# Patient Record
Sex: FEMALE | Race: WHITE | NOT HISPANIC OR LATINO | URBAN - METROPOLITAN AREA
[De-identification: names, ages, dates, MRNs, and addresses within clinical notes are randomized per-mention and may not be internally consistent; named-entity substitution may affect disease eponyms.]

---

## 2021-06-23 ENCOUNTER — APPOINTMENT (RX ONLY)
Dept: URBAN - METROPOLITAN AREA CLINIC 281 | Facility: CLINIC | Age: 61
Setting detail: DERMATOLOGY
End: 2021-06-23

## 2021-06-23 DIAGNOSIS — D22 MELANOCYTIC NEVI: ICD-10-CM

## 2021-06-23 DIAGNOSIS — D18.0 HEMANGIOMA: ICD-10-CM

## 2021-06-23 DIAGNOSIS — L81.4 OTHER MELANIN HYPERPIGMENTATION: ICD-10-CM

## 2021-06-23 PROBLEM — D22.39 MELANOCYTIC NEVI OF OTHER PARTS OF FACE: Status: ACTIVE | Noted: 2021-06-23

## 2021-06-23 PROBLEM — D22.5 MELANOCYTIC NEVI OF TRUNK: Status: ACTIVE | Noted: 2021-06-23

## 2021-06-23 PROBLEM — D18.01 HEMANGIOMA OF SKIN AND SUBCUTANEOUS TISSUE: Status: ACTIVE | Noted: 2021-06-23

## 2021-06-23 PROCEDURE — ? FULL BODY SKIN EXAM

## 2021-06-23 PROCEDURE — ? COUNSELING

## 2021-06-23 PROCEDURE — 99203 OFFICE O/P NEW LOW 30 MIN: CPT

## 2021-06-23 ASSESSMENT — LOCATION ZONE DERM
LOCATION ZONE: TRUNK
LOCATION ZONE: FACE

## 2021-06-23 ASSESSMENT — LOCATION DETAILED DESCRIPTION DERM
LOCATION DETAILED: RIGHT INFERIOR UPPER BACK
LOCATION DETAILED: LEFT LATERAL MALAR CHEEK
LOCATION DETAILED: LEFT SUPERIOR MEDIAL UPPER BACK
LOCATION DETAILED: RIGHT CENTRAL MALAR CHEEK

## 2021-06-23 ASSESSMENT — LOCATION SIMPLE DESCRIPTION DERM
LOCATION SIMPLE: RIGHT UPPER BACK
LOCATION SIMPLE: LEFT CHEEK
LOCATION SIMPLE: LEFT UPPER BACK
LOCATION SIMPLE: RIGHT CHEEK

## 2022-07-20 ENCOUNTER — APPOINTMENT (RX ONLY)
Dept: URBAN - METROPOLITAN AREA CLINIC 281 | Facility: CLINIC | Age: 62
Setting detail: DERMATOLOGY
End: 2022-07-20

## 2022-07-20 DIAGNOSIS — L82.1 OTHER SEBORRHEIC KERATOSIS: ICD-10-CM

## 2022-07-20 DIAGNOSIS — D18.0 HEMANGIOMA: ICD-10-CM

## 2022-07-20 DIAGNOSIS — L91.8 OTHER HYPERTROPHIC DISORDERS OF THE SKIN: ICD-10-CM

## 2022-07-20 DIAGNOSIS — L81.4 OTHER MELANIN HYPERPIGMENTATION: ICD-10-CM

## 2022-07-20 DIAGNOSIS — D22 MELANOCYTIC NEVI: ICD-10-CM

## 2022-07-20 PROBLEM — D22.5 MELANOCYTIC NEVI OF TRUNK: Status: ACTIVE | Noted: 2022-07-20

## 2022-07-20 PROBLEM — D18.01 HEMANGIOMA OF SKIN AND SUBCUTANEOUS TISSUE: Status: ACTIVE | Noted: 2022-07-20

## 2022-07-20 PROBLEM — D22.22 MELANOCYTIC NEVI OF LEFT EAR AND EXTERNAL AURICULAR CANAL: Status: ACTIVE | Noted: 2022-07-20

## 2022-07-20 PROCEDURE — ? FULL BODY SKIN EXAM

## 2022-07-20 PROCEDURE — ? COUNSELING

## 2022-07-20 PROCEDURE — ? SUNSCREEN RECOMMENDATIONS

## 2022-07-20 PROCEDURE — 99213 OFFICE O/P EST LOW 20 MIN: CPT

## 2022-07-20 ASSESSMENT — LOCATION SIMPLE DESCRIPTION DERM
LOCATION SIMPLE: LEFT FOREARM
LOCATION SIMPLE: UPPER BACK
LOCATION SIMPLE: RIGHT POSTERIOR UPPER ARM
LOCATION SIMPLE: LEFT CHEEK
LOCATION SIMPLE: RIGHT FOREARM
LOCATION SIMPLE: RIGHT ANTERIOR NECK
LOCATION SIMPLE: LEFT EAR
LOCATION SIMPLE: CHEST
LOCATION SIMPLE: ABDOMEN

## 2022-07-20 ASSESSMENT — LOCATION ZONE DERM
LOCATION ZONE: FACE
LOCATION ZONE: TRUNK
LOCATION ZONE: ARM
LOCATION ZONE: NECK
LOCATION ZONE: EAR

## 2022-07-20 ASSESSMENT — LOCATION DETAILED DESCRIPTION DERM
LOCATION DETAILED: INFERIOR THORACIC SPINE
LOCATION DETAILED: RIGHT VENTRAL PROXIMAL FOREARM
LOCATION DETAILED: RIGHT INFERIOR LATERAL NECK
LOCATION DETAILED: SUBXIPHOID
LOCATION DETAILED: RIGHT PROXIMAL DORSAL FOREARM
LOCATION DETAILED: LEFT PROXIMAL DORSAL FOREARM
LOCATION DETAILED: LEFT CENTRAL MALAR CHEEK
LOCATION DETAILED: LEFT SUPERIOR HELIX
LOCATION DETAILED: RIGHT PROXIMAL LATERAL POSTERIOR UPPER ARM
LOCATION DETAILED: LEFT DISTAL DORSAL FOREARM
LOCATION DETAILED: UPPER STERNUM

## 2023-07-13 ENCOUNTER — APPOINTMENT (RX ONLY)
Dept: URBAN - METROPOLITAN AREA CLINIC 281 | Facility: CLINIC | Age: 63
Setting detail: DERMATOLOGY
End: 2023-07-13

## 2023-07-13 DIAGNOSIS — D22 MELANOCYTIC NEVI: ICD-10-CM

## 2023-07-13 DIAGNOSIS — Z87.2 PERSONAL HISTORY OF DISEASES OF THE SKIN AND SUBCUTANEOUS TISSUE: ICD-10-CM

## 2023-07-13 DIAGNOSIS — L82.1 OTHER SEBORRHEIC KERATOSIS: ICD-10-CM

## 2023-07-13 DIAGNOSIS — D18.0 HEMANGIOMA: ICD-10-CM

## 2023-07-13 DIAGNOSIS — L81.4 OTHER MELANIN HYPERPIGMENTATION: ICD-10-CM

## 2023-07-13 DIAGNOSIS — L91.8 OTHER HYPERTROPHIC DISORDERS OF THE SKIN: ICD-10-CM

## 2023-07-13 DIAGNOSIS — B00.1 HERPESVIRAL VESICULAR DERMATITIS: ICD-10-CM | Status: WELL CONTROLLED

## 2023-07-13 PROBLEM — D22.5 MELANOCYTIC NEVI OF TRUNK: Status: ACTIVE | Noted: 2023-07-13

## 2023-07-13 PROBLEM — D18.01 HEMANGIOMA OF SKIN AND SUBCUTANEOUS TISSUE: Status: ACTIVE | Noted: 2023-07-13

## 2023-07-13 PROCEDURE — ? FULL BODY SKIN EXAM

## 2023-07-13 PROCEDURE — ? PRESCRIPTION

## 2023-07-13 PROCEDURE — ? SUNSCREEN RECOMMENDATIONS

## 2023-07-13 PROCEDURE — ? COUNSELING

## 2023-07-13 PROCEDURE — 99213 OFFICE O/P EST LOW 20 MIN: CPT

## 2023-07-13 RX ORDER — VALACYCLOVIR 1 G/1
TABLET, FILM COATED ORAL DAILY
Qty: 12 | Refills: 2 | Status: ERX | COMMUNITY
Start: 2023-07-13

## 2023-07-13 RX ADMIN — VALACYCLOVIR: 1 TABLET, FILM COATED ORAL at 00:00

## 2023-07-13 ASSESSMENT — LOCATION SIMPLE DESCRIPTION DERM
LOCATION SIMPLE: RIGHT UPPER BACK
LOCATION SIMPLE: RIGHT ANTERIOR NECK
LOCATION SIMPLE: CHEST
LOCATION SIMPLE: LEFT CHEEK
LOCATION SIMPLE: RIGHT POSTERIOR UPPER ARM
LOCATION SIMPLE: ABDOMEN
LOCATION SIMPLE: LEFT FOREARM
LOCATION SIMPLE: LEFT LIP
LOCATION SIMPLE: RIGHT FOREARM

## 2023-07-13 ASSESSMENT — LOCATION DETAILED DESCRIPTION DERM
LOCATION DETAILED: LEFT CENTRAL MALAR CHEEK
LOCATION DETAILED: RIGHT PROXIMAL LATERAL POSTERIOR UPPER ARM
LOCATION DETAILED: LEFT DISTAL DORSAL FOREARM
LOCATION DETAILED: RIGHT PROXIMAL DORSAL FOREARM
LOCATION DETAILED: RIGHT INFERIOR LATERAL NECK
LOCATION DETAILED: LEFT PROXIMAL DORSAL FOREARM
LOCATION DETAILED: RIGHT SUPERIOR UPPER BACK
LOCATION DETAILED: SUBXIPHOID
LOCATION DETAILED: UPPER STERNUM
LOCATION DETAILED: RIGHT VENTRAL PROXIMAL FOREARM
LOCATION DETAILED: LEFT SUPERIOR VERMILION LIP

## 2023-07-13 ASSESSMENT — LOCATION ZONE DERM
LOCATION ZONE: TRUNK
LOCATION ZONE: ARM
LOCATION ZONE: LIP
LOCATION ZONE: NECK
LOCATION ZONE: FACE

## 2023-07-13 NOTE — HPI: EVALUATION OF SKIN LESION(S)
Hpi Title: Evaluation of Skin Lesions
How Severe Are Your Spot(S)?: mild
Additional History: Has a list of concerns.

## 2024-07-23 ENCOUNTER — APPOINTMENT (RX ONLY)
Dept: URBAN - METROPOLITAN AREA CLINIC 281 | Facility: CLINIC | Age: 64
Setting detail: DERMATOLOGY
End: 2024-07-23

## 2024-07-23 DIAGNOSIS — D22 MELANOCYTIC NEVI: ICD-10-CM

## 2024-07-23 DIAGNOSIS — B00.1 HERPESVIRAL VESICULAR DERMATITIS: ICD-10-CM

## 2024-07-23 DIAGNOSIS — D18.0 HEMANGIOMA: ICD-10-CM

## 2024-07-23 DIAGNOSIS — L81.4 OTHER MELANIN HYPERPIGMENTATION: ICD-10-CM

## 2024-07-23 DIAGNOSIS — L91.8 OTHER HYPERTROPHIC DISORDERS OF THE SKIN: ICD-10-CM

## 2024-07-23 DIAGNOSIS — Z87.2 PERSONAL HISTORY OF DISEASES OF THE SKIN AND SUBCUTANEOUS TISSUE: ICD-10-CM

## 2024-07-23 DIAGNOSIS — L82.1 OTHER SEBORRHEIC KERATOSIS: ICD-10-CM

## 2024-07-23 PROBLEM — D18.01 HEMANGIOMA OF SKIN AND SUBCUTANEOUS TISSUE: Status: ACTIVE | Noted: 2024-07-23

## 2024-07-23 PROBLEM — D22.5 MELANOCYTIC NEVI OF TRUNK: Status: ACTIVE | Noted: 2024-07-23

## 2024-07-23 PROCEDURE — 99213 OFFICE O/P EST LOW 20 MIN: CPT | Mod: 25

## 2024-07-23 PROCEDURE — ? SUNSCREEN RECOMMENDATIONS

## 2024-07-23 PROCEDURE — 11200 RMVL SKIN TAGS UP TO&INC 15: CPT

## 2024-07-23 PROCEDURE — ? FULL BODY SKIN EXAM

## 2024-07-23 PROCEDURE — ? SKIN TAG REMOVAL

## 2024-07-23 PROCEDURE — ? COUNSELING

## 2024-07-23 ASSESSMENT — LOCATION ZONE DERM
LOCATION ZONE: TRUNK
LOCATION ZONE: FACE
LOCATION ZONE: AXILLAE
LOCATION ZONE: LIP
LOCATION ZONE: NECK
LOCATION ZONE: SCALP
LOCATION ZONE: ARM

## 2024-07-23 ASSESSMENT — LOCATION SIMPLE DESCRIPTION DERM
LOCATION SIMPLE: RIGHT ANTERIOR AXILLA
LOCATION SIMPLE: RIGHT FOREARM
LOCATION SIMPLE: LEFT LIP
LOCATION SIMPLE: LEFT CHEEK
LOCATION SIMPLE: SCALP
LOCATION SIMPLE: LEFT FOREARM
LOCATION SIMPLE: RIGHT ANTERIOR NECK
LOCATION SIMPLE: LEFT AXILLARY VAULT
LOCATION SIMPLE: ABDOMEN
LOCATION SIMPLE: RIGHT AXILLARY VAULT
LOCATION SIMPLE: RIGHT UPPER BACK
LOCATION SIMPLE: RIGHT CHEEK
LOCATION SIMPLE: CHEST
LOCATION SIMPLE: RIGHT POSTERIOR UPPER ARM

## 2024-07-23 ASSESSMENT — LOCATION DETAILED DESCRIPTION DERM
LOCATION DETAILED: LEFT SUPERIOR VERMILION LIP
LOCATION DETAILED: RIGHT SUPERIOR NASAL CHEEK
LOCATION DETAILED: LEFT AXILLARY VAULT
LOCATION DETAILED: LEFT INFERIOR FRONTAL SCALP
LOCATION DETAILED: RIGHT RIB CAGE
LOCATION DETAILED: RIGHT SUPERIOR UPPER BACK
LOCATION DETAILED: LEFT PROXIMAL DORSAL FOREARM
LOCATION DETAILED: RIGHT AXILLARY VAULT
LOCATION DETAILED: LEFT DISTAL DORSAL FOREARM
LOCATION DETAILED: RIGHT PROXIMAL LATERAL POSTERIOR UPPER ARM
LOCATION DETAILED: RIGHT INFERIOR LATERAL NECK
LOCATION DETAILED: RIGHT PROXIMAL DORSAL FOREARM
LOCATION DETAILED: RIGHT ANTERIOR AXILLA
LOCATION DETAILED: SUBXIPHOID
LOCATION DETAILED: UPPER STERNUM
LOCATION DETAILED: RIGHT VENTRAL PROXIMAL FOREARM
LOCATION DETAILED: LEFT CENTRAL MALAR CHEEK

## 2024-07-23 NOTE — PROCEDURE: SKIN TAG REMOVAL
Medical Necessity Information: It is in your best interest to select a reason for this procedure from the list below. All of these items fulfill various CMS LCD requirements except the new and changing color options.
Include Z78.9 (Other Specified Conditions Influencing Health Status) As An Associated Diagnosis?: No
Add Associated Diagnoses If Applicable When Selecting Medical Necessity: Yes
Medical Necessity Clause: This procedure was medically necessary because the lesions that were treated were:
Anesthesia Type: 1% lidocaine with epinephrine
Anesthesia Volume In Cc: 3
Consent: Written consent obtained and the risks of skin tag removal was reviewed with the patient including but not limited to bleeding, pigmentary change, infection, pain, and remote possibility of scarring.
Detail Level: Detailed

## 2024-09-04 ENCOUNTER — APPOINTMENT (RX ONLY)
Dept: URBAN - METROPOLITAN AREA CLINIC 281 | Facility: CLINIC | Age: 64
Setting detail: DERMATOLOGY
End: 2024-09-04

## 2024-09-04 DIAGNOSIS — M71 OTHER BURSOPATHIES: ICD-10-CM

## 2024-09-04 DIAGNOSIS — D22 MELANOCYTIC NEVI: ICD-10-CM

## 2024-09-04 DIAGNOSIS — D18.0 HEMANGIOMA: ICD-10-CM

## 2024-09-04 DIAGNOSIS — B35.1 TINEA UNGUIUM: ICD-10-CM

## 2024-09-04 DIAGNOSIS — L82.0 INFLAMED SEBORRHEIC KERATOSIS: ICD-10-CM

## 2024-09-04 PROBLEM — M71.341 OTHER BURSAL CYST, RIGHT HAND: Status: ACTIVE | Noted: 2024-09-04

## 2024-09-04 PROBLEM — M71.342 OTHER BURSAL CYST, LEFT HAND: Status: ACTIVE | Noted: 2024-09-04

## 2024-09-04 PROBLEM — D18.01 HEMANGIOMA OF SKIN AND SUBCUTANEOUS TISSUE: Status: ACTIVE | Noted: 2024-09-04

## 2024-09-04 PROBLEM — D22.22 MELANOCYTIC NEVI OF LEFT EAR AND EXTERNAL AURICULAR CANAL: Status: ACTIVE | Noted: 2024-09-04

## 2024-09-04 PROCEDURE — ? LIQUID NITROGEN

## 2024-09-04 PROCEDURE — 17110 DESTRUCTION B9 LES UP TO 14: CPT

## 2024-09-04 PROCEDURE — 99213 OFFICE O/P EST LOW 20 MIN: CPT | Mod: 25

## 2024-09-04 PROCEDURE — ? COUNSELING

## 2024-09-04 PROCEDURE — ? TREATMENT REGIMEN

## 2024-09-04 ASSESSMENT — LOCATION DETAILED DESCRIPTION DERM
LOCATION DETAILED: LEFT MID DORSAL INDEX FINGER
LOCATION DETAILED: LEFT LATERAL ABDOMEN
LOCATION DETAILED: RIGHT SMALL FINGER PROXIMAL INTERPHALANGEAL JOINT
LOCATION DETAILED: LEFT POSTERIOR EAR
LOCATION DETAILED: LEFT SMALL FINGER PROXIMAL INTERPHALANGEAL JOINT
LOCATION DETAILED: RIGHT GREAT TOENAIL
LOCATION DETAILED: LEFT GREAT TOENAIL
LOCATION DETAILED: LEFT SUPERIOR LATERAL NECK

## 2024-09-04 ASSESSMENT — LOCATION SIMPLE DESCRIPTION DERM
LOCATION SIMPLE: LEFT SMALL FINGER
LOCATION SIMPLE: LEFT GREAT TOE
LOCATION SIMPLE: RIGHT SMALL FINGER
LOCATION SIMPLE: RIGHT GREAT TOE
LOCATION SIMPLE: ABDOMEN
LOCATION SIMPLE: NECK
LOCATION SIMPLE: LEFT INDEX FINGER
LOCATION SIMPLE: LEFT EAR

## 2024-09-04 ASSESSMENT — LOCATION ZONE DERM
LOCATION ZONE: EAR
LOCATION ZONE: TOENAIL
LOCATION ZONE: TRUNK
LOCATION ZONE: NECK
LOCATION ZONE: FINGER

## 2024-09-04 NOTE — PROCEDURE: LIQUID NITROGEN
Render Post-Care Instructions In Note?: no
Post-Care Instructions: I reviewed with the patient in detail post-care instructions. Patient is to wear sunprotection, and avoid picking at any of the treated lesions. Pt may apply Vaseline to crusted or scabbing areas.
Spray Paint Text: The liquid nitrogen was applied to the skin utilizing a spray paint frosting technique.
Show Applicator Variable?: Yes
Detail Level: Detailed
Medical Necessity Clause: This procedure was medically necessary because the lesions that were treated were:
Number Of Freeze-Thaw Cycles: 1 freeze-thaw cycle
Medical Necessity Information: It is in your best interest to select a reason for this procedure from the list below. All of these items fulfill various CMS LCD requirements except the new and changing color options.
Consent: The patient's verbal consent was obtained including but not limited to risks of crusting, scabbing, blistering, scarring, darker or lighter pigmentary change, recurrence, incomplete removal and infection.

## 2024-10-22 ENCOUNTER — APPOINTMENT (RX ONLY)
Dept: URBAN - METROPOLITAN AREA CLINIC 281 | Facility: CLINIC | Age: 64
Setting detail: DERMATOLOGY
End: 2024-10-22

## 2024-10-22 DIAGNOSIS — L82.1 OTHER SEBORRHEIC KERATOSIS: ICD-10-CM

## 2024-10-22 DIAGNOSIS — L72.0 EPIDERMAL CYST: ICD-10-CM

## 2024-10-22 DIAGNOSIS — B35.1 TINEA UNGUIUM: ICD-10-CM | Status: IMPROVED

## 2024-10-22 PROCEDURE — ? TREATMENT REGIMEN

## 2024-10-22 PROCEDURE — ? COUNSELING

## 2024-10-22 PROCEDURE — 99213 OFFICE O/P EST LOW 20 MIN: CPT

## 2024-10-22 ASSESSMENT — LOCATION SIMPLE DESCRIPTION DERM
LOCATION SIMPLE: LEFT GREAT TOE
LOCATION SIMPLE: RIGHT GREAT TOE
LOCATION SIMPLE: RIGHT NOSE
LOCATION SIMPLE: NOSE

## 2024-10-22 ASSESSMENT — LOCATION ZONE DERM
LOCATION ZONE: NOSE
LOCATION ZONE: TOENAIL

## 2024-10-22 ASSESSMENT — LOCATION DETAILED DESCRIPTION DERM
LOCATION DETAILED: RIGHT GREAT TOENAIL
LOCATION DETAILED: NASAL ROOT
LOCATION DETAILED: LEFT GREAT TOENAIL
LOCATION DETAILED: RIGHT NASAL SIDEWALL

## 2025-01-24 ENCOUNTER — RX ONLY (RX ONLY)
Age: 65
End: 2025-01-24

## 2025-01-24 RX ORDER — VALACYCLOVIR 1 G/1
TABLET, FILM COATED ORAL DAILY
Qty: 12 | Refills: 0 | Status: ERX

## 2025-06-25 ENCOUNTER — APPOINTMENT (OUTPATIENT)
Dept: URBAN - METROPOLITAN AREA CLINIC 281 | Facility: CLINIC | Age: 65
Setting detail: DERMATOLOGY
End: 2025-06-25

## 2025-06-25 DIAGNOSIS — D18.0 HEMANGIOMA: ICD-10-CM

## 2025-06-25 DIAGNOSIS — L82.1 OTHER SEBORRHEIC KERATOSIS: ICD-10-CM

## 2025-06-25 DIAGNOSIS — L81.4 OTHER MELANIN HYPERPIGMENTATION: ICD-10-CM

## 2025-06-25 DIAGNOSIS — D22 MELANOCYTIC NEVI: ICD-10-CM

## 2025-06-25 DIAGNOSIS — B35.1 TINEA UNGUIUM: ICD-10-CM

## 2025-06-25 PROBLEM — D22.5 MELANOCYTIC NEVI OF TRUNK: Status: ACTIVE | Noted: 2025-06-25

## 2025-06-25 PROBLEM — D18.01 HEMANGIOMA OF SKIN AND SUBCUTANEOUS TISSUE: Status: ACTIVE | Noted: 2025-06-25

## 2025-06-25 PROCEDURE — ? FULL BODY SKIN EXAM

## 2025-06-25 PROCEDURE — ? SUNSCREEN RECOMMENDATIONS

## 2025-06-25 PROCEDURE — ? TREATMENT REGIMEN

## 2025-06-25 PROCEDURE — ? COUNSELING

## 2025-06-25 ASSESSMENT — LOCATION ZONE DERM
LOCATION ZONE: ARM
LOCATION ZONE: TOENAIL
LOCATION ZONE: FACE
LOCATION ZONE: TRUNK

## 2025-06-25 ASSESSMENT — LOCATION DETAILED DESCRIPTION DERM
LOCATION DETAILED: LEFT GREAT TOENAIL
LOCATION DETAILED: LEFT PROXIMAL DORSAL FOREARM
LOCATION DETAILED: LEFT DISTAL DORSAL FOREARM
LOCATION DETAILED: INFERIOR THORACIC SPINE
LOCATION DETAILED: RIGHT PROXIMAL DORSAL FOREARM
LOCATION DETAILED: LEFT CENTRAL MALAR CHEEK
LOCATION DETAILED: RIGHT VENTRAL PROXIMAL FOREARM
LOCATION DETAILED: UPPER STERNUM

## 2025-06-25 ASSESSMENT — LOCATION SIMPLE DESCRIPTION DERM
LOCATION SIMPLE: UPPER BACK
LOCATION SIMPLE: CHEST
LOCATION SIMPLE: LEFT CHEEK
LOCATION SIMPLE: LEFT GREAT TOE
LOCATION SIMPLE: RIGHT FOREARM
LOCATION SIMPLE: LEFT FOREARM

## 2025-06-25 NOTE — PROCEDURE: FULL BODY SKIN EXAM
Price (Do Not Change): 0.00
Detail Level: Simple
Instructions: This plan will send the code FBSE to the PM system.  DO NOT or CHANGE the price.
Influenza vaccination ([facility] Pub Date: August 7, 2015)/gastrointestinal bleed